# Patient Record
Sex: FEMALE | Race: WHITE | NOT HISPANIC OR LATINO
[De-identification: names, ages, dates, MRNs, and addresses within clinical notes are randomized per-mention and may not be internally consistent; named-entity substitution may affect disease eponyms.]

---

## 2021-09-29 ENCOUNTER — NON-APPOINTMENT (OUTPATIENT)
Age: 56
End: 2021-09-29

## 2021-09-29 ENCOUNTER — APPOINTMENT (OUTPATIENT)
Dept: INTERNAL MEDICINE | Facility: CLINIC | Age: 56
End: 2021-09-29
Payer: COMMERCIAL

## 2021-09-29 VITALS
SYSTOLIC BLOOD PRESSURE: 122 MMHG | HEIGHT: 66 IN | OXYGEN SATURATION: 98 % | RESPIRATION RATE: 14 BRPM | HEART RATE: 85 BPM | TEMPERATURE: 97.2 F | WEIGHT: 190 LBS | DIASTOLIC BLOOD PRESSURE: 76 MMHG | BODY MASS INDEX: 30.53 KG/M2

## 2021-09-29 DIAGNOSIS — Z83.79 FAMILY HISTORY OF OTHER DISEASES OF THE DIGESTIVE SYSTEM: ICD-10-CM

## 2021-09-29 DIAGNOSIS — Z82.49 FAMILY HISTORY OF ISCHEMIC HEART DISEASE AND OTHER DISEASES OF THE CIRCULATORY SYSTEM: ICD-10-CM

## 2021-09-29 DIAGNOSIS — Z63.5 DISRUPTION OF FAMILY BY SEPARATION AND DIVORCE: ICD-10-CM

## 2021-09-29 DIAGNOSIS — Z23 ENCOUNTER FOR IMMUNIZATION: ICD-10-CM

## 2021-09-29 DIAGNOSIS — J30.2 OTHER SEASONAL ALLERGIC RHINITIS: ICD-10-CM

## 2021-09-29 DIAGNOSIS — Z83.3 FAMILY HISTORY OF DIABETES MELLITUS: ICD-10-CM

## 2021-09-29 DIAGNOSIS — Z80.6 FAMILY HISTORY OF LEUKEMIA: ICD-10-CM

## 2021-09-29 DIAGNOSIS — Z78.9 OTHER SPECIFIED HEALTH STATUS: ICD-10-CM

## 2021-09-29 PROCEDURE — 99386 PREV VISIT NEW AGE 40-64: CPT | Mod: 25

## 2021-09-29 PROCEDURE — 93000 ELECTROCARDIOGRAM COMPLETE: CPT

## 2021-09-29 RX ORDER — MULTIVIT-MIN/FA/LYCOPEN/LUTEIN .4-300-25
TABLET ORAL
Refills: 0 | Status: ACTIVE | COMMUNITY
Start: 2021-09-29

## 2021-09-29 SDOH — SOCIAL STABILITY - SOCIAL INSECURITY: DISRUPTION OF FAMILY BY SEPARATION AND DIVORCE: Z63.5

## 2021-09-29 NOTE — ASSESSMENT
[FreeTextEntry1] : -PMH: HTN, Anxiety, Depression\par -SH: . 2 Children. Non-smoker. Occasional EtOH use. m\par \par DEONTE is a 56 year F whom is here today for an annual well check and to establish care w/ a new PMD\par \par Specialists Involved:\par -Prior PMD: Dr. Kaylyn Cramer ([phone])\par -OBGYN: Dr. Aubrie Espitia ([phone])\par -GI: Dr. Foley\par \par EKG obtained in office today demonstrates NSR. normal axis/Intervals. Good-R-wave progression. Normal EKG. \par \par -F/u labs drawn in office today\par -Further recs pending lab results\par -F/u Colonoscopy report from GI\par -F/u Mammogram & Pap from Gyn\par -F/u Immunization records from prior PMD\par -RTO 6mo for routine f/u & labs or sooner if needed

## 2021-09-29 NOTE — HISTORY OF PRESENT ILLNESS
[FreeTextEntry1] : establish care\par Annual well visit [de-identified] : -PMH: HTN, Anxiety, Depression\par -SH: . 2 Children. Lives w/her mother. Non-smoker. Occasional EtOH use. m\par \par DEONTE is a 56 year F whom is here today for an annual well check and to establish care w/ a new PMD\par Today, pt reports feeling well and is w/o complaints. \par She denies any changes since our last f/u visit\par \par Specialists Involved:\par -Prior PMD: Dr. Kaylyn Cramer ([phone])\par -OBGYN: Dr. Aubrie Espitia ([phone])\par -GI: Dr. Foley\par \par -Vaccines: Needs Flu, Shingles (Declines due to concern for allergies)\par -Mammogram: 12/2020\par -Pap Smear: 12/2020\par -Colonoscopy: 6/2017\par -FH of Colon, breast or ovarian CA: None known\par \par -HTN: Remains on Losartan 25mg QD. No reported changes\par -Anxiety, Depression: Relates it to the loss of her father in 2017. Reports issues with anxiety & depression in the past. Had been on Paxil in the past. Follows w/ a therapist Qweekly.

## 2021-09-29 NOTE — HEALTH RISK ASSESSMENT
[Very Good] : ~his/her~  mood as very good [3] : 2) Feeling down, depressed, or hopeless for nearly every day (3) [PHQ-2 Positive] : PHQ-2 Positive [Patient declined mammogram] : Patient declined mammogram [Patient declined PAP Smear] : Patient declined PAP Smear [HIV test declined] : HIV test declined [Hepatitis C test declined] : Hepatitis C test declined [Alone] : lives alone [None] : None [Employed] : employed [] :  [# Of Children ___] : has [unfilled] children [Reviewed no changes] : Reviewed, no changes [] : No [No] : No [Moderately Severe] : severity of depression is moderately severe [Audit-CScore] : 0 [WQB8Qnlku] : 6 [AZA5EzicjNobvg] : 19 [Change in mental status noted] : No change in mental status noted [Reports changes in hearing] : Reports no changes in hearing [Reports changes in vision] : Reports no changes in vision [MammogramDate] : 12/20 [MammogramComments] : records requested [PapSmearDate] : 12/20 [PapSmearComments] : records requested [ColonoscopyComments] : records requested [AdvancecareDate] : 09/21

## 2021-09-30 ENCOUNTER — NON-APPOINTMENT (OUTPATIENT)
Age: 56
End: 2021-09-30

## 2021-09-30 LAB
ALBUMIN SERPL ELPH-MCNC: 4.6 G/DL
ALP BLD-CCNC: 102 U/L
ALT SERPL-CCNC: 31 U/L
ANION GAP SERPL CALC-SCNC: 13 MMOL/L
AST SERPL-CCNC: 22 U/L
BASOPHILS # BLD AUTO: 0.08 K/UL
BASOPHILS NFR BLD AUTO: 0.9 %
BILIRUB SERPL-MCNC: 0.2 MG/DL
BUN SERPL-MCNC: 20 MG/DL
CALCIUM SERPL-MCNC: 9.6 MG/DL
CHLORIDE SERPL-SCNC: 105 MMOL/L
CHOLEST SERPL-MCNC: 242 MG/DL
CO2 SERPL-SCNC: 23 MMOL/L
CREAT SERPL-MCNC: 0.91 MG/DL
CREAT SPEC-SCNC: 135 MG/DL
EOSINOPHIL # BLD AUTO: 0.51 K/UL
EOSINOPHIL NFR BLD AUTO: 5.8 %
ESTIMATED AVERAGE GLUCOSE: 128 MG/DL
GLUCOSE SERPL-MCNC: 115 MG/DL
HBA1C MFR BLD HPLC: 6.1 %
HCT VFR BLD CALC: 42.3 %
HDLC SERPL-MCNC: 37 MG/DL
HGB BLD-MCNC: 13.5 G/DL
IMM GRANULOCYTES NFR BLD AUTO: 0.7 %
LDLC SERPL CALC-MCNC: 142 MG/DL
LYMPHOCYTES # BLD AUTO: 1.68 K/UL
LYMPHOCYTES NFR BLD AUTO: 19.1 %
MAN DIFF?: NORMAL
MCHC RBC-ENTMCNC: 28.3 PG
MCHC RBC-ENTMCNC: 31.9 GM/DL
MCV RBC AUTO: 88.7 FL
MICROALBUMIN 24H UR DL<=1MG/L-MCNC: 1.6 MG/DL
MICROALBUMIN/CREAT 24H UR-RTO: 12 MG/G
MONOCYTES # BLD AUTO: 0.62 K/UL
MONOCYTES NFR BLD AUTO: 7.1 %
NEUTROPHILS # BLD AUTO: 5.83 K/UL
NEUTROPHILS NFR BLD AUTO: 66.4 %
NONHDLC SERPL-MCNC: 205 MG/DL
PLATELET # BLD AUTO: 341 K/UL
POTASSIUM SERPL-SCNC: 4.6 MMOL/L
PROT SERPL-MCNC: 7.1 G/DL
RBC # BLD: 4.77 M/UL
RBC # FLD: 14.4 %
SODIUM SERPL-SCNC: 141 MMOL/L
TRIGL SERPL-MCNC: 315 MG/DL
WBC # FLD AUTO: 8.78 K/UL

## 2021-10-10 ENCOUNTER — EMERGENCY (EMERGENCY)
Facility: HOSPITAL | Age: 56
LOS: 1 days | Discharge: HOME | End: 2021-10-10
Attending: EMERGENCY MEDICINE
Payer: COMMERCIAL

## 2021-10-10 VITALS
WEIGHT: 184.97 LBS | RESPIRATION RATE: 18 BRPM | HEART RATE: 91 BPM | TEMPERATURE: 99 F | SYSTOLIC BLOOD PRESSURE: 173 MMHG | OXYGEN SATURATION: 98 % | HEIGHT: 65 IN | DIASTOLIC BLOOD PRESSURE: 78 MMHG

## 2021-10-10 PROCEDURE — 99284 EMERGENCY DEPT VISIT MOD MDM: CPT

## 2021-10-10 PROCEDURE — 99282 EMERGENCY DEPT VISIT SF MDM: CPT

## 2021-10-10 NOTE — ED PROVIDER NOTE - CARE PROVIDER_API CALL
Vaughn Cheung)  Ophthalmology  [address], [address]  [place] [address]  Phone: [phone]  Fax: [phone]  Follow Up Time: Urgent

## 2021-10-10 NOTE — ED PROVIDER NOTE - NSFOLLOWUPINSTRUCTIONS_ED_ALL_ED_FT
1. Follow up with ophthalmology tomorrow for reevaluation.  2.  Return to the Emergency Department for worsening, progressive or any other concerning symptoms.         Vitreous Detachment       Vitreous detachment is part of the normal aging process in the eyes. Vitreous is the jelly-like substance that makes up most of the inside of the eyeballs. It helps the eyeballs keep a round shape. The vitreous is attached to the retina of the eye with a series of fibers.    As you age, the vitreous gradually shrinks. Tension increases between the fibers and the retina. Eventually, the fibers can break free from the retina, causing vitreous detachment. In most cases, this does not cause problems and does not require treatment. However, it can sometimes cause the retina to separate from the eyeball (retinal detachment), which requires treatment to prevent vision loss.      What are the causes?    Aging is the main cause of vitreous detachment. Everyone's vitreous naturally shrinks with age.      What increases the risk?  You are more likely to have vitreous detachment if you:  •Are at least 50 years old.      •Have inflammation of the eye.      •Have an eye injury.      •Have had eye surgery.      •Have a hemorrhage in your eye.      •Are very nearsighted (myopia).      •Have diabetes.        What are the signs or symptoms?  Most people with this condition will not notice any symptoms. If symptoms do occur, the most common are floaters. Floaters occur as the vitreous begins to shrink. They may:  •Appear as tiny dots or webs in your vision.      •Seem to disappear when you look at them directly.      •Appear more often as your condition gets worse.    Other symptoms include:  •Flashes of light (photopsia) in your peripheral vision that may look like lightning streaks.      •Decreased vision or a dark curtain or shadow moving across your field of vision. This is rare.        How is this diagnosed?  This condition may be diagnosed based on:  •Your signs and symptoms.    •An exam by a health care provider who specializes in conditions and diseases of the eye (ophthalmologist). The exam may include:  •Putting eye drops in your eye to make the pupil wider (dilated). The pupil is the opening in the center of the eye.      •Checking the pupils with a magnifying glass. This exam is the best way to determine the type and extent of damage to your eye.          How is this treated?    For most people, a vitreous detachment is harmless, causing no symptoms or vision loss, and does not require treatment. Floaters usually become less noticeable over time.    If the condition causes retinal detachment, you may need eye surgery to reattach your retina (reattachment surgery) in order to prevent vision loss or restore your vision.      Follow these instructions at home:    •Keep all follow-up visits as told by your health care provider. This is important.        Get help right away if:  •You develop signs of retinal detachment. These include:  •A sudden increase in the number of floaters you see.      •An increase in the number of flashes of light you see in your peripheral vision.      •Decreased vision.          Summary    •Vitreous detachment is part of the normal aging process in the eyes.      •Vitreous is the jelly-like substance inside the eyeballs. As you age, the vitreous shrinks, and the fibers that attach the vitreous to the retina can break free, causing vitreous detachment.      •In most cases, vitreous detachment does not cause symptoms and does not require treatment. The most common symptom that can occur is seeing floaters that appear as tiny dots or webs in your vision.      •Vitreous detachment can sometimes cause the retina to separate from the eyeball (retinal detachment). This must be treated to prevent vision loss.      This information is not intended to replace advice given to you by your health care provider. Make sure you discuss any questions you have with your health care provider.

## 2021-10-10 NOTE — ED PROVIDER NOTE - OBJECTIVE STATEMENT
55yo female with PMH HTN, h/o lasik eye surgery, floaters, follows with ophthalmologist every 6 months presenting with vision change. Patient states that yesterday, she saw a flash of light and then had solitary grey spot in eye that partially obscures vision, went to sleep. When she woke up, she still had spot in eye. Denies headache, nausea, vomiting, eye trauma.

## 2021-10-10 NOTE — ED PROVIDER NOTE - PHYSICAL EXAMINATION
Gen: NAD, AOx3  Head: NCAT  HEENT: PERRL, oral mucosa moist, normal conjunctiva, oropharynx clear without exudate or erythema, visual acuity 20/20 left, 20/40 right  Lung: CTAB, no respiratory distress, no wheezing, rales, rhonchi  CV: normal s1/s2, rrr, no murmurs, Normal perfusion  Abd: soft, NTND  MSK: No edema, no visible deformities  Neuro: No focal neurologic deficits  Skin: No rash   Psych: normal affect   POCUS: Retina intact, vitreous hemorrhage visualized

## 2021-10-10 NOTE — ED PROVIDER NOTE - PATIENT PORTAL LINK FT
You can access the FollowMyHealth Patient Portal offered by [facility] by registering at the following website: [email or web address]. By joining [employer]’s FollowMyHealth portal, you will also be able to view your health information using other applications (apps) compatible with our system.

## 2021-10-10 NOTE — ED PROVIDER NOTE - CLINICAL SUMMARY MEDICAL DECISION MAKING FREE TEXT BOX
Patient with vitreous hemorrhage of R eye, neuro intact, no evidence of retinal detachment on POCUS.

## 2021-10-10 NOTE — ED ADULT TRIAGE NOTE - CHIEF COMPLAINT QUOTE
patient states last night stated seeing flash of light and a black shadow in right eye with floaters, was seen by eye doctor was told that she had floaters, still seeing flashes when she was driving here, sees a big black shadow denies pain or off balance or dizziness, feeling anxious

## 2021-11-10 ENCOUNTER — APPOINTMENT (OUTPATIENT)
Dept: INTERNAL MEDICINE | Facility: CLINIC | Age: 56
End: 2021-11-10
Payer: COMMERCIAL

## 2021-11-10 VITALS
RESPIRATION RATE: 14 BRPM | SYSTOLIC BLOOD PRESSURE: 126 MMHG | WEIGHT: 185 LBS | HEIGHT: 66 IN | DIASTOLIC BLOOD PRESSURE: 76 MMHG | TEMPERATURE: 97.3 F | OXYGEN SATURATION: 98 % | BODY MASS INDEX: 29.73 KG/M2 | HEART RATE: 78 BPM

## 2021-11-10 DIAGNOSIS — H33.311 HORSESHOE TEAR OF RETINA W/OUT DETACHMENT, RIGHT EYE: ICD-10-CM

## 2021-11-10 PROCEDURE — 99214 OFFICE O/P EST MOD 30 MIN: CPT

## 2021-11-10 NOTE — HISTORY OF PRESENT ILLNESS
[FreeTextEntry1] : Anxiety & Depression [de-identified] : -PMH: HTN, HLD, Anxiety, Depression, H/o R. Retinal tear\par -SH: . 2 Children. Lives w/her mother. Non-smoker. Occasional EtOH use. m\par \par DEONTE is a 56 year F whom is here today for f/u Anxiety & Depression after initiation of Fluoxetine\par Today, pt reports that she was recently Dx w/ a right retinal tear which is being treated and she noted improvement. \par Today, pt reports improvement in anxiety since starting fluoxetine. She feels less anxious and denies feeling down or depressed\par \par -HTN: Remains on Losartan 25mg QD. No reported changes\par -Anxiety, Depression: Relates it to the loss of her father in 2017. Reports issues with anxiety & depression in the past. Had been on Paxil in the past. Follows w/ a therapist Qweekly. Changes as noted above.

## 2021-11-10 NOTE — ASSESSMENT
[FreeTextEntry1] : -PMH: HTN, Anxiety, Depression\par -SH: . 2 Children. Non-smoker. Occasional EtOH use. m\par \par DEONTE is a 57 year F whom is here today for f/u Anxiety & Depression\par \par Specialists Involved:\par -Prior PMD: Dr. Kaylyn Cramer ([phone])\par -OBGYN: Dr. Aubrie Espitia ([phone])\par -GI: Dr. Foley\par \par -RTO 6mo for routine f/u & labs or sooner if needed

## 2022-06-01 ENCOUNTER — APPOINTMENT (OUTPATIENT)
Dept: INTERNAL MEDICINE | Facility: CLINIC | Age: 57
End: 2022-06-01
Payer: COMMERCIAL

## 2022-06-01 VITALS
TEMPERATURE: 97.7 F | SYSTOLIC BLOOD PRESSURE: 130 MMHG | HEIGHT: 66 IN | HEART RATE: 101 BPM | DIASTOLIC BLOOD PRESSURE: 70 MMHG | BODY MASS INDEX: 31.98 KG/M2 | WEIGHT: 199 LBS | OXYGEN SATURATION: 98 %

## 2022-06-01 PROCEDURE — 99214 OFFICE O/P EST MOD 30 MIN: CPT | Mod: 25

## 2022-06-01 RX ORDER — FLUOCINOLONE ACETONIDE 0.1 MG/ML
0.01 SOLUTION TOPICAL
Qty: 60 | Refills: 0 | Status: DISCONTINUED | COMMUNITY
Start: 2021-12-02

## 2022-06-01 RX ORDER — MECLIZINE HYDROCHLORIDE 25 MG/1
25 TABLET ORAL
Qty: 60 | Refills: 0 | Status: DISCONTINUED | COMMUNITY
Start: 2022-02-09

## 2022-06-01 RX ORDER — AMOXICILLIN AND CLAVULANATE POTASSIUM 875; 125 MG/1; MG/1
875-125 TABLET, COATED ORAL
Qty: 20 | Refills: 0 | Status: DISCONTINUED | COMMUNITY
Start: 2022-02-01

## 2022-06-01 RX ORDER — FLUTICASONE PROPIONATE 50 UG/1
50 SPRAY, METERED NASAL
Qty: 16 | Refills: 0 | Status: DISCONTINUED | COMMUNITY
Start: 2022-01-16

## 2022-06-01 RX ORDER — HYDROCORTISONE AND ACETIC ACID OTIC 20.75; 10.375 MG/ML; MG/ML
1-2 SOLUTION AURICULAR (OTIC)
Qty: 10 | Refills: 0 | Status: DISCONTINUED | COMMUNITY
Start: 2022-02-09

## 2022-06-01 RX ORDER — MOMETASONE FUROATE 1 MG/G
0.1 OINTMENT TOPICAL
Qty: 15 | Refills: 0 | Status: DISCONTINUED | COMMUNITY
Start: 2022-02-09

## 2022-06-01 NOTE — ASSESSMENT
[FreeTextEntry1] : -PMH: HTN, HLD, Anxiety, Depression, H/o R. Retinal tear\par -SH: . 2 Children. Lives w/her mother. Non-smoker. Occasional EtOH use. \par \par DEONTE is a 56 year F whom is here today for f/u HTN, Anxiety & Depression\par \par Specialists Involved:\par -OBGYN: Dr. Aubrie Espitia ([phone])\par -GI: Dr. Foley ([phone])\par \par -F/u labs drawn in office today\par -Further recs pending lab results\par -RTO January 2023 for CPE or sooner if needed or prefered

## 2022-06-01 NOTE — HISTORY OF PRESENT ILLNESS
[FreeTextEntry1] : Anxiety & Depression [de-identified] : -PMH: HTN, HLD, Anxiety, Depression, H/o R. Retinal tear\par -SH: . 2 Children. Lives w/her mother. Non-smoker. Occasional EtOH use. \par \par DEONTE is a 56 year F whom is here today for f/u HTN, Anxiety & Depression\par Today, pt reports that she is feeling well  \par She also reports continued improvement in anxiety since being placed on fluoxetine. \par \par -HTN: Remains on Losartan 25mg QD. No reported changes\par -Anxiety, Depression: Relates it to the loss of her father in 2017. Reports issues with anxiety & depression in the past. Had been on Paxil in the past. Follows w/ a therapist Qweekly. Changes as noted above.

## 2022-06-02 ENCOUNTER — NON-APPOINTMENT (OUTPATIENT)
Age: 57
End: 2022-06-02

## 2022-06-02 LAB
ALBUMIN SERPL ELPH-MCNC: 4.6 G/DL
ALP BLD-CCNC: 112 U/L
ALT SERPL-CCNC: 39 U/L
ANION GAP SERPL CALC-SCNC: 13 MMOL/L
AST SERPL-CCNC: 27 U/L
BASOPHILS # BLD AUTO: 0.09 K/UL
BASOPHILS NFR BLD AUTO: 1.1 %
BILIRUB SERPL-MCNC: 0.2 MG/DL
BUN SERPL-MCNC: 19 MG/DL
CALCIUM SERPL-MCNC: 10.1 MG/DL
CHLORIDE SERPL-SCNC: 104 MMOL/L
CHOLEST SERPL-MCNC: 234 MG/DL
CO2 SERPL-SCNC: 25 MMOL/L
CREAT SERPL-MCNC: 0.87 MG/DL
CREAT SPEC-SCNC: 95 MG/DL
EGFR: 77 ML/MIN/1.73M2
EOSINOPHIL # BLD AUTO: 0.63 K/UL
EOSINOPHIL NFR BLD AUTO: 7.5 %
ESTIMATED AVERAGE GLUCOSE: 140 MG/DL
GLUCOSE SERPL-MCNC: 85 MG/DL
HBA1C MFR BLD HPLC: 6.5 %
HCT VFR BLD CALC: 43.6 %
HDLC SERPL-MCNC: 33 MG/DL
HGB BLD-MCNC: 13.7 G/DL
IMM GRANULOCYTES NFR BLD AUTO: 0.8 %
LDLC SERPL CALC-MCNC: NORMAL MG/DL
LYMPHOCYTES # BLD AUTO: 0.79 K/UL
LYMPHOCYTES NFR BLD AUTO: 9.4 %
MAN DIFF?: NORMAL
MCHC RBC-ENTMCNC: 28.5 PG
MCHC RBC-ENTMCNC: 31.4 GM/DL
MCV RBC AUTO: 90.8 FL
MICROALBUMIN 24H UR DL<=1MG/L-MCNC: 1.3 MG/DL
MICROALBUMIN/CREAT 24H UR-RTO: 13 MG/G
MONOCYTES # BLD AUTO: 0.66 K/UL
MONOCYTES NFR BLD AUTO: 7.9 %
NEUTROPHILS # BLD AUTO: 6.14 K/UL
NEUTROPHILS NFR BLD AUTO: 73.3 %
NONHDLC SERPL-MCNC: 201 MG/DL
PLATELET # BLD AUTO: 317 K/UL
POTASSIUM SERPL-SCNC: 5 MMOL/L
PROT SERPL-MCNC: 7.3 G/DL
RBC # BLD: 4.8 M/UL
RBC # FLD: 13.9 %
SODIUM SERPL-SCNC: 141 MMOL/L
TRIGL SERPL-MCNC: 410 MG/DL
WBC # FLD AUTO: 8.38 K/UL

## 2022-06-08 ENCOUNTER — APPOINTMENT (OUTPATIENT)
Dept: INTERNAL MEDICINE | Facility: CLINIC | Age: 57
End: 2022-06-08

## 2023-01-18 ENCOUNTER — APPOINTMENT (OUTPATIENT)
Dept: INTERNAL MEDICINE | Facility: CLINIC | Age: 58
End: 2023-01-18
Payer: COMMERCIAL

## 2023-01-18 ENCOUNTER — NON-APPOINTMENT (OUTPATIENT)
Age: 58
End: 2023-01-18

## 2023-01-18 VITALS
OXYGEN SATURATION: 98 % | HEART RATE: 80 BPM | TEMPERATURE: 96.7 F | SYSTOLIC BLOOD PRESSURE: 124 MMHG | WEIGHT: 193 LBS | BODY MASS INDEX: 31.02 KG/M2 | DIASTOLIC BLOOD PRESSURE: 80 MMHG | HEIGHT: 66 IN

## 2023-01-18 DIAGNOSIS — R73.03 PREDIABETES.: ICD-10-CM

## 2023-01-18 PROCEDURE — 99214 OFFICE O/P EST MOD 30 MIN: CPT | Mod: 25

## 2023-01-19 LAB
ALBUMIN SERPL ELPH-MCNC: 4.5 G/DL
ALP BLD-CCNC: 101 U/L
ALT SERPL-CCNC: 34 U/L
ANION GAP SERPL CALC-SCNC: 13 MMOL/L
AST SERPL-CCNC: 22 U/L
BILIRUB SERPL-MCNC: 0.3 MG/DL
BUN SERPL-MCNC: 19 MG/DL
CALCIUM SERPL-MCNC: 9.5 MG/DL
CHLORIDE SERPL-SCNC: 105 MMOL/L
CHOLEST SERPL-MCNC: 250 MG/DL
CO2 SERPL-SCNC: 24 MMOL/L
CREAT SERPL-MCNC: 0.96 MG/DL
EGFR: 68 ML/MIN/1.73M2
ESTIMATED AVERAGE GLUCOSE: 148 MG/DL
GLUCOSE SERPL-MCNC: 105 MG/DL
HBA1C MFR BLD HPLC: 6.8 %
HDLC SERPL-MCNC: 33 MG/DL
LDLC SERPL CALC-MCNC: 166 MG/DL
NONHDLC SERPL-MCNC: 217 MG/DL
POTASSIUM SERPL-SCNC: 5 MMOL/L
PROT SERPL-MCNC: 7.1 G/DL
SODIUM SERPL-SCNC: 141 MMOL/L
TRIGL SERPL-MCNC: 253 MG/DL

## 2023-01-19 NOTE — ASSESSMENT
[FreeTextEntry1] : -PMH: HTN, HLD, Anxiety, Depression, H/o R. Retinal tear\par -SH: . 2 Children. Lives w/her mother. Non-smoker. Occasional EtOH use. \par \par DEONTE is a 57 year F whom is here today for f/u HTN, HLD, Anxiety & Depression\par \par Specialists Involved:\par -OBGYN: Dr. Aubrie Espitia ([phone])\par -GI: Dr. Foley ([phone])\par \par -F/u labs drawn in office today\par -Further recs pending lab results\par -Continue routine f/u w/ Gyn (Cervical CA Screening)\par -RTO 6mo for CPE or sooner if needed

## 2023-01-19 NOTE — ADDENDUM
[FreeTextEntry1] : Uncontrolled cholesterol in setting of persistent DM\par Recommend starting Lipitor 10mg HS\par RTO 3mo \par \par A1c further increased from 6.5 to 6.8\par Diert, exercise & weight strongly advised\par Hold off on starting any medication.\par She will see me back in office in 3mo\par If A1c still elevated at that time we will discuss starting medication for her diabertes

## 2023-01-19 NOTE — HISTORY OF PRESENT ILLNESS
[FreeTextEntry1] : HTN, HLD, Anxiety & Depression [de-identified] : -PMH: HTN, HLD, Anxiety, Depression, H/o R. Retinal tear\par -SH: . 2 Children. Lives w/her mother. Non-smoker. Occasional EtOH use. \par \par DEONTE is a 57 year F whom is here today for f/u HTN, HLD, Anxiety & Depression\par Today, pt reports that she is feeling well \par \par -HTN: Remains on Losartan 25mg QD. No reported changes\par -HLD: Diet controlled\par -T2DM: (6/2022) A1c 6.5\par -Anxiety, Depression: Relates it to the loss of her father in 2017. Reports issues with anxiety & depression in the past. Remains on Fluoxetine 10mg QD. Follows w/ a therapist Qweekly. Changes as noted above.

## 2023-02-07 ENCOUNTER — LABORATORY RESULT (OUTPATIENT)
Age: 58
End: 2023-02-07

## 2023-02-07 ENCOUNTER — NON-APPOINTMENT (OUTPATIENT)
Age: 58
End: 2023-02-07

## 2023-02-07 ENCOUNTER — APPOINTMENT (OUTPATIENT)
Dept: INTERNAL MEDICINE | Facility: CLINIC | Age: 58
End: 2023-02-07
Payer: COMMERCIAL

## 2023-02-07 VITALS
HEART RATE: 77 BPM | BODY MASS INDEX: 31.02 KG/M2 | OXYGEN SATURATION: 98 % | DIASTOLIC BLOOD PRESSURE: 70 MMHG | TEMPERATURE: 97.3 F | SYSTOLIC BLOOD PRESSURE: 122 MMHG | HEIGHT: 66 IN | WEIGHT: 193 LBS

## 2023-02-07 PROCEDURE — 99396 PREV VISIT EST AGE 40-64: CPT | Mod: 25

## 2023-02-07 PROCEDURE — 93000 ELECTROCARDIOGRAM COMPLETE: CPT

## 2023-02-07 RX ORDER — ATORVASTATIN CALCIUM 10 MG/1
10 TABLET, FILM COATED ORAL
Qty: 90 | Refills: 1 | Status: DISCONTINUED | COMMUNITY
Start: 2023-01-24 | End: 2023-02-07

## 2023-02-07 NOTE — PHYSICAL EXAM
[No Acute Distress] : no acute distress [Well Nourished] : well nourished [Well Developed] : well developed [Well-Appearing] : well-appearing [Normal Sclera/Conjunctiva] : normal sclera/conjunctiva [PERRL] : pupils equal round and reactive to light [EOMI] : extraocular movements intact [Normal Outer Ear/Nose] : the outer ears and nose were normal in appearance [Normal Oropharynx] : the oropharynx was normal [No JVD] : no jugular venous distention [No Lymphadenopathy] : no lymphadenopathy [Supple] : supple [Thyroid Normal, No Nodules] : the thyroid was normal and there were no nodules present [No Respiratory Distress] : no respiratory distress  [No Accessory Muscle Use] : no accessory muscle use [Clear to Auscultation] : lungs were clear to auscultation bilaterally [Normal Rate] : normal rate  [Regular Rhythm] : with a regular rhythm [Normal S1, S2] : normal S1 and S2 [No Murmur] : no murmur heard [No Carotid Bruits] : no carotid bruits [No Abdominal Bruit] : a ~M bruit was not heard ~T in the abdomen [No Varicosities] : no varicosities [Pedal Pulses Present] : the pedal pulses are present [No Edema] : there was no peripheral edema [No Palpable Aorta] : no palpable aorta [No Extremity Clubbing/Cyanosis] : no extremity clubbing/cyanosis [Soft] : abdomen soft [Non Tender] : non-tender [Non-distended] : non-distended [No Masses] : no abdominal mass palpated [No HSM] : no HSM [Normal Bowel Sounds] : normal bowel sounds [No CVA Tenderness] : no CVA  tenderness [No Spinal Tenderness] : no spinal tenderness [No Joint Swelling] : no joint swelling [Grossly Normal Strength/Tone] : grossly normal strength/tone [No Rash] : no rash [Coordination Grossly Intact] : coordination grossly intact [No Focal Deficits] : no focal deficits [Normal Gait] : normal gait [Deep Tendon Reflexes (DTR)] : deep tendon reflexes were 2+ and symmetric [Normal Affect] : the affect was normal [Normal Insight/Judgement] : insight and judgment were intact [Comprehensive Foot Exam Normal] : Right and left foot were examined and both feet are normal. No ulcers in either foot. Toes are normal and with full ROM.  Normal tactile sensation with monofilament testing throughout both feet

## 2023-02-08 LAB
ALBUMIN SERPL ELPH-MCNC: 4.5 G/DL
ALP BLD-CCNC: 96 U/L
ALT SERPL-CCNC: 34 U/L
ANION GAP SERPL CALC-SCNC: 12 MMOL/L
AST SERPL-CCNC: 22 U/L
BASOPHILS # BLD AUTO: 0.09 K/UL
BASOPHILS NFR BLD AUTO: 1.1 %
BILIRUB SERPL-MCNC: 0.2 MG/DL
BUN SERPL-MCNC: 17 MG/DL
CALCIUM SERPL-MCNC: 9.5 MG/DL
CHLORIDE SERPL-SCNC: 106 MMOL/L
CHOLEST SERPL-MCNC: 175 MG/DL
CO2 SERPL-SCNC: 25 MMOL/L
CREAT SERPL-MCNC: 0.94 MG/DL
EGFR: 70 ML/MIN/1.73M2
EOSINOPHIL # BLD AUTO: 0.49 K/UL
EOSINOPHIL NFR BLD AUTO: 5.8 %
ESTIMATED AVERAGE GLUCOSE: 143 MG/DL
GLUCOSE SERPL-MCNC: 119 MG/DL
HBA1C MFR BLD HPLC: 6.6 %
HCT VFR BLD CALC: 42 %
HDLC SERPL-MCNC: 34 MG/DL
HGB BLD-MCNC: 13.1 G/DL
IMM GRANULOCYTES NFR BLD AUTO: 0.8 %
LDLC SERPL CALC-MCNC: 107 MG/DL
LYMPHOCYTES # BLD AUTO: 2.05 K/UL
LYMPHOCYTES NFR BLD AUTO: 24.1 %
MAN DIFF?: NORMAL
MCHC RBC-ENTMCNC: 28.1 PG
MCHC RBC-ENTMCNC: 31.2 GM/DL
MCV RBC AUTO: 90.1 FL
MONOCYTES # BLD AUTO: 0.61 K/UL
MONOCYTES NFR BLD AUTO: 7.2 %
NEUTROPHILS # BLD AUTO: 5.2 K/UL
NEUTROPHILS NFR BLD AUTO: 61 %
NONHDLC SERPL-MCNC: 141 MG/DL
PLATELET # BLD AUTO: 351 K/UL
POTASSIUM SERPL-SCNC: 4.7 MMOL/L
PROT SERPL-MCNC: 6.7 G/DL
RBC # BLD: 4.66 M/UL
RBC # FLD: 14.1 %
SODIUM SERPL-SCNC: 143 MMOL/L
TRIGL SERPL-MCNC: 173 MG/DL
TSH SERPL-ACNC: 1.47 UIU/ML
WBC # FLD AUTO: 8.51 K/UL

## 2023-02-08 NOTE — ASSESSMENT
[FreeTextEntry1] : -PMH: HTN, HLD, Anxiety, Depression, H/o R. Retinal tear\par -SH: . 2 Children. Lives w/her mother. Non-smoker. Occasional EtOH use.\par \par DEONTE is a 57 year F whom is here today for an annual well check\par \par Specialists Involved:\par -OBGYN: Dr. Aubrie Espitia ([phone])\par -GI: Dr. Foley ([phone])\par \par EKG obtained in office today demonstrates NSR. normal axis/Intervals. Good-R-wave progression. Normal EKG. \par \par -F/u labs drawn in office today\par -Further recs pending lab results\par -Continue routine screening w/ Gyn (Cervical CA Screening)\par -RTO 6mo for routine f/u & labs or sooner if needed

## 2023-02-08 NOTE — HISTORY OF PRESENT ILLNESS
[FreeTextEntry1] : establish care\par Annual well visit [de-identified] : -PMH: HTN, HLD, Anxiety, Depression, H/o R. Retinal tear\par -SH: . 2 Children. Lives w/her mother. Non-smoker. Occasional EtOH use.\par \par DEONTE is a 57 year F whom is here today for an annual well check\par \par Specialists Involved:\par -OBGYN: Dr. Aubrie Espitia ([phone])\par -GI: Dr. Foley ([phone])\par -Optho: Dr. Chalo Lizarraga\par \par -Vaccines: Needs COVID, Flu, Shingles, Prevnar 20 (Declines due to concern for allergies)\par -Mammogram: 12/2022\par -Pap Smear: 1/2023\par -Colonoscopy: 6/2017. Repeat 10yrs\par -FH of Colon, breast or ovarian CA: None known\par \par -HTN: Remains on Losartan 25mg QD. No reported changes\par -HLD: Myalgias developed since starting Lipitor\par -T2DM: Diet controlled. (6/2022) A1c 6.5\par -Anxiety, Depression: Relates it to the loss of her father in 2017. Reports issues with anxiety & depression in the past. Remains on Fluoxetine 10mg QD. Follows w/ a therapist Qweekly. Changes as noted above.

## 2023-02-08 NOTE — ADDENDUM
[FreeTextEntry1] : Persistent DM with an A1c of 6.6\par Diet, exercise & weight loss advised\par RTO 3mo for repeat labs\par If still elevated at that time i would recommend starting Metformin\par \par Cholesterol notably improved on statin\par Recommendations regarding this as discussed in office yesterday\par

## 2023-02-08 NOTE — HEALTH RISK ASSESSMENT
[Very Good] : ~his/her~  mood as very good [No] : In the past 12 months have you used drugs other than those required for medical reasons? No [HIV test declined] : HIV test declined [Hepatitis C test declined] : Hepatitis C test declined [None] : None [Alone] : lives alone [Employed] : employed [] :  [# Of Children ___] : has [unfilled] children [Reviewed no changes] : Reviewed, no changes [No falls in past year] : Patient reported no falls in the past year [0] : 2) Feeling down, depressed, or hopeless: Not at all (0) [PHQ-2 Negative - No further assessment needed] : PHQ-2 Negative - No further assessment needed [No Retinopathy] : No retinopathy [Patient reported mammogram was normal] : Patient reported mammogram was normal [Patient reported PAP Smear was abnormal] : Patient reported PAP Smear was abnormal [Patient reported colonoscopy was normal] : Patient reported colonoscopy was normal [Fully functional (bathing, dressing, toileting, transferring, walking, feeding)] : Fully functional (bathing, dressing, toileting, transferring, walking, feeding) [Fully functional (using the telephone, shopping, preparing meals, housekeeping, doing laundry, using] : Fully functional and needs no help or supervision to perform IADLs (using the telephone, shopping, preparing meals, housekeeping, doing laundry, using transportation, managing medications and managing finances) [Never] : Never [Audit-CScore] : 0 [[id number]] : 0 [EyeExamDate] : 12/22 [Change in mental status noted] : No change in mental status noted [Reports changes in hearing] : Reports no changes in hearing [Reports changes in vision] : Reports no changes in vision [MammogramDate] : 12/22 [PapSmearDate] : 01/23 [ColonoscopyDate] : 06/17 [AdvancecareDate] : 02/23

## 2023-02-09 LAB
CREAT SPEC-SCNC: 237 MG/DL
MICROALBUMIN 24H UR DL<=1MG/L-MCNC: 2.3 MG/DL
MICROALBUMIN/CREAT 24H UR-RTO: 10 MG/G

## 2023-03-06 ENCOUNTER — RX RENEWAL (OUTPATIENT)
Age: 58
End: 2023-03-06

## 2023-03-23 ENCOUNTER — RX RENEWAL (OUTPATIENT)
Age: 58
End: 2023-03-23

## 2023-07-31 ENCOUNTER — RX RENEWAL (OUTPATIENT)
Age: 58
End: 2023-07-31

## 2023-09-27 ENCOUNTER — RX RENEWAL (OUTPATIENT)
Age: 58
End: 2023-09-27

## 2023-10-03 ENCOUNTER — APPOINTMENT (OUTPATIENT)
Dept: INTERNAL MEDICINE | Facility: CLINIC | Age: 58
End: 2023-10-03
Payer: COMMERCIAL

## 2023-10-03 VITALS
DIASTOLIC BLOOD PRESSURE: 70 MMHG | OXYGEN SATURATION: 98 % | WEIGHT: 196 LBS | HEART RATE: 69 BPM | SYSTOLIC BLOOD PRESSURE: 120 MMHG | RESPIRATION RATE: 14 BRPM | BODY MASS INDEX: 31.5 KG/M2 | HEIGHT: 66 IN | TEMPERATURE: 97.2 F

## 2023-10-03 DIAGNOSIS — R53.83 OTHER FATIGUE: ICD-10-CM

## 2023-10-03 DIAGNOSIS — F41.1 GENERALIZED ANXIETY DISORDER: ICD-10-CM

## 2023-10-03 DIAGNOSIS — E78.5 HYPERLIPIDEMIA, UNSPECIFIED: ICD-10-CM

## 2023-10-03 PROCEDURE — 99214 OFFICE O/P EST MOD 30 MIN: CPT | Mod: 25

## 2023-10-03 RX ORDER — CETIRIZINE HYDROCHLORIDE 10 MG/1
10 CAPSULE, LIQUID FILLED ORAL
Qty: 90 | Refills: 3 | Status: ACTIVE | COMMUNITY
Start: 2021-09-29 | End: 1900-01-01

## 2023-10-04 LAB
ALBUMIN SERPL ELPH-MCNC: 4.5 G/DL
ALP BLD-CCNC: 103 U/L
ALT SERPL-CCNC: 40 U/L
ANION GAP SERPL CALC-SCNC: 13 MMOL/L
AST SERPL-CCNC: 25 U/L
BILIRUB SERPL-MCNC: 0.3 MG/DL
BUN SERPL-MCNC: 18 MG/DL
CALCIUM SERPL-MCNC: 9.4 MG/DL
CHLORIDE SERPL-SCNC: 105 MMOL/L
CO2 SERPL-SCNC: 23 MMOL/L
CREAT SERPL-MCNC: 0.86 MG/DL
CREAT SPEC-SCNC: 106 MG/DL
EGFR: 77 ML/MIN/1.73M2
GLUCOSE SERPL-MCNC: 137 MG/DL
MICROALBUMIN 24H UR DL<=1MG/L-MCNC: <1.2 MG/DL
MICROALBUMIN/CREAT 24H UR-RTO: NORMAL MG/G
POTASSIUM SERPL-SCNC: 4.6 MMOL/L
PROT SERPL-MCNC: 6.9 G/DL
SODIUM SERPL-SCNC: 141 MMOL/L

## 2023-10-09 LAB
BASOPHILS # BLD AUTO: 0.12 K/UL
BASOPHILS NFR BLD AUTO: 1.1 %
CHOLEST SERPL-MCNC: 206 MG/DL
EOSINOPHIL # BLD AUTO: 0.86 K/UL
EOSINOPHIL NFR BLD AUTO: 7.6 %
ESTIMATED AVERAGE GLUCOSE: 154 MG/DL
FERRITIN SERPL-MCNC: 187 NG/ML
HBA1C MFR BLD HPLC: 7 %
HCT VFR BLD CALC: 41.9 %
HDLC SERPL-MCNC: 40 MG/DL
HGB BLD-MCNC: 13.3 G/DL
IMM GRANULOCYTES NFR BLD AUTO: 0.7 %
IRON SATN MFR SERPL: 28 %
IRON SERPL-MCNC: 86 UG/DL
LDLC SERPL CALC-MCNC: 112 MG/DL
LYMPHOCYTES # BLD AUTO: 2.32 K/UL
LYMPHOCYTES NFR BLD AUTO: 20.5 %
MAN DIFF?: NORMAL
MCHC RBC-ENTMCNC: 28.5 PG
MCHC RBC-ENTMCNC: 31.7 GM/DL
MCV RBC AUTO: 89.9 FL
MONOCYTES # BLD AUTO: 0.78 K/UL
MONOCYTES NFR BLD AUTO: 6.9 %
NEUTROPHILS # BLD AUTO: 7.14 K/UL
NEUTROPHILS NFR BLD AUTO: 63.2 %
NONHDLC SERPL-MCNC: 166 MG/DL
PLATELET # BLD AUTO: 314 K/UL
RBC # BLD: 4.66 M/UL
RBC # FLD: 13.8 %
TIBC SERPL-MCNC: 304 UG/DL
TRIGL SERPL-MCNC: 308 MG/DL
UIBC SERPL-MCNC: 218 UG/DL
WBC # FLD AUTO: 11.3 K/UL

## 2023-10-11 ENCOUNTER — NON-APPOINTMENT (OUTPATIENT)
Age: 58
End: 2023-10-11

## 2023-10-23 ENCOUNTER — APPOINTMENT (OUTPATIENT)
Dept: INTERNAL MEDICINE | Facility: CLINIC | Age: 58
End: 2023-10-23
Payer: COMMERCIAL

## 2023-10-23 PROCEDURE — 36415 COLL VENOUS BLD VENIPUNCTURE: CPT

## 2023-10-24 LAB
BASOPHILS # BLD AUTO: 0.11 K/UL
BASOPHILS NFR BLD AUTO: 1.1 %
EOSINOPHIL # BLD AUTO: 0.63 K/UL
EOSINOPHIL NFR BLD AUTO: 6.1 %
HCT VFR BLD CALC: 42.3 %
HGB BLD-MCNC: 13.8 G/DL
IMM GRANULOCYTES NFR BLD AUTO: 0.7 %
LYMPHOCYTES # BLD AUTO: 2.62 K/UL
LYMPHOCYTES NFR BLD AUTO: 25.2 %
MAN DIFF?: NORMAL
MCHC RBC-ENTMCNC: 28.9 PG
MCHC RBC-ENTMCNC: 32.6 GM/DL
MCV RBC AUTO: 88.7 FL
MONOCYTES # BLD AUTO: 0.78 K/UL
MONOCYTES NFR BLD AUTO: 7.5 %
NEUTROPHILS # BLD AUTO: 6.17 K/UL
NEUTROPHILS NFR BLD AUTO: 59.4 %
PLATELET # BLD AUTO: 363 K/UL
RBC # BLD: 4.77 M/UL
RBC # FLD: 13 %
WBC # FLD AUTO: 10.38 K/UL

## 2023-12-18 ENCOUNTER — RX RENEWAL (OUTPATIENT)
Age: 58
End: 2023-12-18

## 2024-01-11 ENCOUNTER — APPOINTMENT (OUTPATIENT)
Dept: INTERNAL MEDICINE | Facility: CLINIC | Age: 59
End: 2024-01-11
Payer: COMMERCIAL

## 2024-01-11 ENCOUNTER — TRANSCRIPTION ENCOUNTER (OUTPATIENT)
Age: 59
End: 2024-01-11

## 2024-01-11 VITALS
TEMPERATURE: 97.2 F | OXYGEN SATURATION: 98 % | WEIGHT: 197 LBS | HEART RATE: 80 BPM | SYSTOLIC BLOOD PRESSURE: 112 MMHG | BODY MASS INDEX: 31.66 KG/M2 | RESPIRATION RATE: 14 BRPM | DIASTOLIC BLOOD PRESSURE: 78 MMHG | HEIGHT: 66 IN

## 2024-01-11 DIAGNOSIS — Z86.2 PERSONAL HISTORY OF DISEASES OF THE BLOOD AND BLOOD-FORMING ORGANS AND CERTAIN DISORDERS INVOLVING THE IMMUNE MECHANISM: ICD-10-CM

## 2024-01-11 PROCEDURE — 99214 OFFICE O/P EST MOD 30 MIN: CPT | Mod: 25

## 2024-01-12 ENCOUNTER — RX RENEWAL (OUTPATIENT)
Age: 59
End: 2024-01-12

## 2024-01-12 LAB
ALBUMIN SERPL ELPH-MCNC: 4.5 G/DL
ALP BLD-CCNC: 106 U/L
ALT SERPL-CCNC: 35 U/L
ANION GAP SERPL CALC-SCNC: 15 MMOL/L
AST SERPL-CCNC: 29 U/L
BILIRUB SERPL-MCNC: 0.2 MG/DL
BUN SERPL-MCNC: 20 MG/DL
CALCIUM SERPL-MCNC: 9.6 MG/DL
CHLORIDE SERPL-SCNC: 103 MMOL/L
CHOLEST SERPL-MCNC: 175 MG/DL
CO2 SERPL-SCNC: 22 MMOL/L
CREAT SERPL-MCNC: 0.87 MG/DL
CREAT SPEC-SCNC: 125 MG/DL
EGFR: 76 ML/MIN/1.73M2
ESTIMATED AVERAGE GLUCOSE: 186 MG/DL
GLUCOSE SERPL-MCNC: 157 MG/DL
HBA1C MFR BLD HPLC: 8.1 %
HDLC SERPL-MCNC: 35 MG/DL
LDLC SERPL CALC-MCNC: 84 MG/DL
MICROALBUMIN 24H UR DL<=1MG/L-MCNC: 1.4 MG/DL
MICROALBUMIN/CREAT 24H UR-RTO: 11 MG/G
NONHDLC SERPL-MCNC: 140 MG/DL
POTASSIUM SERPL-SCNC: 4.8 MMOL/L
PROT SERPL-MCNC: 7.1 G/DL
SODIUM SERPL-SCNC: 140 MMOL/L
TRIGL SERPL-MCNC: 345 MG/DL

## 2024-01-12 RX ORDER — CETIRIZINE HYDROCHLORIDE 10 MG/1
10 TABLET, COATED ORAL
Qty: 90 | Refills: 3 | Status: ACTIVE | COMMUNITY
Start: 2021-11-10 | End: 1900-01-01

## 2024-01-12 NOTE — ASSESSMENT
[FreeTextEntry1] :  -F/u labs drawn in office today -Further recs pending lab results -RTO 3mo or sooner if needed

## 2024-01-12 NOTE — ADDENDUM
[FreeTextEntry1] :  A1c 8.1 from 7.0 At this time I would strongly recommend the initiation of medication to help control her blood glucose levels while she attempts to make dietary/lifestyle modifications with goal of weight loss Recommend metformin 500 mg twice daily She is to return to office in 3 months sooner if needed I did also like to set her up with a blood glucose monitor and supplies If she needs instruction on how to use it she can return to office so we can educate her Her cholesterol is improved but not yet at goal Therefore recommend increase rosuvastatin up to 20 mg RTO 3 months

## 2024-01-12 NOTE — HISTORY OF PRESENT ILLNESS
[FreeTextEntry1] : f/u DM, HTN, JOURDAN, HLD [de-identified] : -PMH: DM, HTN, HLD, Anxiety, Depression, H/o R. Retinal tear -SH: . 2 Children. Lives w/her mother. Non-smoker. Occasional EtOH use.  DEONTE is a 58 year F whom is here today for f/u DM, HTN, JOURDAN, HLD today, pt reports feeling well   -HTN: Remains on Losartan 25mg QD. No reported changes -HLD: Remains on Crestor 10mg -T2DM: Diet controlled. (10/2023) A1c 7.0 -Anxiety, Depression: Relates it to the loss of her father in 2017. Reports issues with anxiety & depression in the past. Remains on Fluoxetine 10mg QD. Follows w/ a therapist Qweekly. Changes as noted above.

## 2024-01-15 RX ORDER — BLOOD-GLUCOSE METER
KIT MISCELLANEOUS
Qty: 1 | Refills: 0 | Status: ACTIVE | COMMUNITY
Start: 2024-01-15 | End: 1900-01-01

## 2024-01-15 RX ORDER — BLOOD-GLUCOSE METER
W/DEVICE KIT MISCELLANEOUS
Qty: 1 | Refills: 0 | Status: ACTIVE | COMMUNITY
Start: 2024-01-15 | End: 1900-01-01

## 2024-01-17 ENCOUNTER — APPOINTMENT (OUTPATIENT)
Dept: INTERNAL MEDICINE | Facility: CLINIC | Age: 59
End: 2024-01-17
Payer: COMMERCIAL

## 2024-01-17 PROCEDURE — 97535 SELF CARE MNGMENT TRAINING: CPT

## 2024-01-22 ENCOUNTER — NON-APPOINTMENT (OUTPATIENT)
Age: 59
End: 2024-01-22

## 2024-02-21 ENCOUNTER — APPOINTMENT (OUTPATIENT)
Dept: INTERNAL MEDICINE | Facility: CLINIC | Age: 59
End: 2024-02-21

## 2024-03-14 ENCOUNTER — APPOINTMENT (OUTPATIENT)
Dept: INTERNAL MEDICINE | Facility: CLINIC | Age: 59
End: 2024-03-14
Payer: COMMERCIAL

## 2024-03-14 VITALS
SYSTOLIC BLOOD PRESSURE: 100 MMHG | HEART RATE: 77 BPM | OXYGEN SATURATION: 98 % | RESPIRATION RATE: 14 BRPM | HEIGHT: 66 IN | BODY MASS INDEX: 30.53 KG/M2 | DIASTOLIC BLOOD PRESSURE: 68 MMHG | WEIGHT: 190 LBS | TEMPERATURE: 97.3 F

## 2024-03-14 VITALS — SYSTOLIC BLOOD PRESSURE: 138 MMHG | DIASTOLIC BLOOD PRESSURE: 80 MMHG

## 2024-03-14 LAB
BASOPHILS # BLD AUTO: 0.13 K/UL
BASOPHILS NFR BLD AUTO: 1.3 %
EOSINOPHIL # BLD AUTO: 0.82 K/UL
EOSINOPHIL NFR BLD AUTO: 8.3 %
HCT VFR BLD CALC: 40.5 %
HGB BLD-MCNC: 13.7 G/DL
IMM GRANULOCYTES NFR BLD AUTO: 0.4 %
LYMPHOCYTES # BLD AUTO: 2.18 K/UL
LYMPHOCYTES NFR BLD AUTO: 22 %
MAN DIFF?: NORMAL
MCHC RBC-ENTMCNC: 28.4 PG
MCHC RBC-ENTMCNC: 33.8 GM/DL
MCV RBC AUTO: 83.9 FL
MONOCYTES # BLD AUTO: 0.67 K/UL
MONOCYTES NFR BLD AUTO: 6.8 %
NEUTROPHILS # BLD AUTO: 6.06 K/UL
NEUTROPHILS NFR BLD AUTO: 61.2 %
PLATELET # BLD AUTO: 367 K/UL
RBC # BLD: 4.83 M/UL
RBC # FLD: 13.3 %
WBC # FLD AUTO: 9.9 K/UL

## 2024-03-14 PROCEDURE — 99214 OFFICE O/P EST MOD 30 MIN: CPT

## 2024-03-14 RX ORDER — METFORMIN HYDROCHLORIDE 500 MG/1
500 TABLET, COATED ORAL
Qty: 90 | Refills: 3 | Status: DISCONTINUED | COMMUNITY
Start: 2024-01-15 | End: 2024-03-14

## 2024-03-14 RX ORDER — METFORMIN HYDROCHLORIDE 500 MG/1
500 TABLET, FILM COATED, EXTENDED RELEASE ORAL
Qty: 90 | Refills: 0 | Status: ACTIVE | COMMUNITY
Start: 2024-03-14 | End: 1900-01-01

## 2024-03-14 NOTE — ASSESSMENT
[FreeTextEntry1] : Normal arterial ulses LE b/l Normal monofilament test Check labs today to r/o causes of neuritis Recommend EMG & Neuro consult for completion of evaluation  Change metformin to ER formulation 500mg QD RTO 1mo for CPE She has loss weight. encouragement provided C/w Bg monitoiring

## 2024-03-14 NOTE — HISTORY OF PRESENT ILLNESS
[FreeTextEntry8] : -PMH: DM, HTN, HLD, Anxiety, Depression, H/o R. Retinal tear -SH: . 2 Children. Lives w/her mother. Non-smoker. Occasional EtOH use.  DEONTE is a 58 year F whom is here today w/ c/o b/l LE Pain & Thingling sensation ongoing for almost 2mo m she dnies any loss of sensation or difficulties walking only new med is metformin Pain not worsened with exertion or improved with rest never had anything like this before   bring BG Log with her today for my review. states she was unable to tolerate metformin twice a day 2/2 GIN Upset tehrefore only taking once daily

## 2024-03-18 LAB
FOLATE SERPL-MCNC: 12.8 NG/ML
TSH SERPL-ACNC: 1.55 UIU/ML
VIT B12 SERPL-MCNC: 479 PG/ML

## 2024-03-21 ENCOUNTER — APPOINTMENT (OUTPATIENT)
Dept: INTERNAL MEDICINE | Facility: CLINIC | Age: 59
End: 2024-03-21

## 2024-03-25 ENCOUNTER — RX RENEWAL (OUTPATIENT)
Age: 59
End: 2024-03-25

## 2024-04-01 ENCOUNTER — APPOINTMENT (OUTPATIENT)
Dept: NEUROLOGY | Facility: CLINIC | Age: 59
End: 2024-04-01
Payer: COMMERCIAL

## 2024-04-01 VITALS
HEIGHT: 66 IN | OXYGEN SATURATION: 100 % | WEIGHT: 190 LBS | HEART RATE: 71 BPM | BODY MASS INDEX: 30.53 KG/M2 | SYSTOLIC BLOOD PRESSURE: 132 MMHG | DIASTOLIC BLOOD PRESSURE: 79 MMHG

## 2024-04-01 DIAGNOSIS — G62.9 POLYNEUROPATHY, UNSPECIFIED: ICD-10-CM

## 2024-04-01 DIAGNOSIS — H33.319 HORSESHOE TEAR OF RETINA W/OUT DETACHMENT, UNSPECIFIED EYE: ICD-10-CM

## 2024-04-01 DIAGNOSIS — M79.89 OTHER SPECIFIED SOFT TISSUE DISORDERS: ICD-10-CM

## 2024-04-01 PROCEDURE — 99204 OFFICE O/P NEW MOD 45 MIN: CPT

## 2024-04-01 NOTE — PHYSICAL EXAM
[Place] : oriented to place [Person] : oriented to person [Time] : oriented to time [Concentration Intact] : normal concentrating ability [Naming Objects] : no difficulty naming common objects [Visual Intact] : visual attention was ~T not ~L decreased [Repeating Phrases] : no difficulty repeating a phrase [Writing A Sentence] : no difficulty writing a sentence [Fluency] : fluency intact [Comprehension] : comprehension intact [Reading] : reading intact [Past History] : adequate knowledge of personal past history [Cranial Nerves Oculomotor (III)] : extraocular motion intact [Cranial Nerves Optic (II)] : visual acuity intact bilaterally,  visual fields full to confrontation, pupils equal round and reactive to light [Cranial Nerves Trigeminal (V)] : facial sensation intact symmetrically [Cranial Nerves Vestibulocochlear (VIII)] : hearing was intact bilaterally [Cranial Nerves Facial (VII)] : face symmetrical [Cranial Nerves Accessory (XI - Cranial And Spinal)] : head turning and shoulder shrug symmetric [Cranial Nerves Glossopharyngeal (IX)] : tongue and palate midline [Motor Strength] : muscle strength was normal in all four extremities [Cranial Nerves Hypoglossal (XII)] : there was no tongue deviation with protrusion [Motor Handedness Right-Handed] : the patient is right hand dominant [No Muscle Atrophy] : normal bulk in all four extremities [Sensation Tactile Decrease] : light touch was intact [Sensation Pain / Temperature Decrease] : pain and temperature was intact [Proprioception] : proprioception was intact [Tactile Decrease Superficial Peroneal Nerve Right Leg] : diminished right dorsum of the foot [Vibration Decrease Leg / Foot Right Toes] : decreased at the toes of the right foot [Balance] : balance was intact [2+] : Patella left 2+ [1+] : Ankle jerk left 1+ [General Appearance - In No Acute Distress] : in no acute distress [General Appearance - Alert] : alert [Oriented To Time, Place, And Person] : oriented to person, place, and time [Affect] : the affect was normal [Impaired Insight] : insight and judgment were intact [Over the Past 2 Weeks, Have You Felt Down, Depressed, or Hopeless?] : 1.) Over the past 2 weeks, have you felt down, depressed, or hopeless? No [Over the Past 2 Weeks, Have You Felt Little Interest or Pleasure Doing Things?] : 2.) Over the past 2 weeks, have you felt little interest or pleasure doing things? No [Past-pointing] : there was no past-pointing [Tremor] : no tremor present [Plantar Reflex Right Only] : normal on the right [Plantar Reflex Left Only] : normal on the left [Sclera] : the sclera and conjunctiva were normal [PERRL With Normal Accommodation] : pupils were equal in size, round, reactive to light, with normal accommodation [Extraocular Movements] : extraocular movements were intact [Full Visual Field] : full visual field [Outer Ear] : the ears and nose were normal in appearance [Hearing Threshold Finger Rub Not [place]] : hearing was normal [Oropharynx] : the oropharynx was normal [Neck Appearance] : the appearance of the neck was normal [Respiration, Rhythm And Depth] : normal respiratory rhythm and effort [Nail Clubbing] : no clubbing  or cyanosis of the fingernails [Abnormal Walk] : normal gait [Motor Tone] : muscle strength and tone were normal [Musculoskeletal - Swelling] : no joint swelling seen [Skin Color & Pigmentation] : normal skin color and pigmentation [] : no rash [Skin Turgor] : normal skin turgor

## 2024-04-01 NOTE — DISCUSSION/SUMMARY
[FreeTextEntry1] : 57 y/o woman hx of HTN, HLD, DM II (recent diagnosis), p/w numbness and tingling, feeling of cold in bilateral feet. Most likely related to diabetes and elevated blood sugar. Will rule out other reversible causes including arteriopathy given cold sensation in the legs.   Workup   Labs:  Peripheral neuropathy workup. -  B1, B6, homocysteine, A1c, SHAGGY, Anti  dsDNA, SPEP,, ceruloplasmin, RF, ESR, CRP,   - US LE arterial bilat  - She has EMG/NCS ordered by PCP     Treatment:  - Start alpha- lipioc acid 1600mg daily  - Wants to hold off other treatment for now.     Discussed common side effects of prescribed medications and potential alternatives.          Counseled patient on the importance of maintaining a healthy lifestyle, including balanced diet, adequate hydration, stress management, proper sleep hygiene, and physical activity.  Answered all questions and concerns to the best of my ability. Advised to call for any new or worsening symptoms.      In order to maintain continuity of care/prescription refills, patients must be seen on a yearly basis.   Total time spent on the day of the visit, including pre-visit and post-visit time was 50 minutes.

## 2024-04-01 NOTE — HISTORY OF PRESENT ILLNESS
[FreeTextEntry1] : CC: pain and tingling in B/L LE  Referred by: PCP Dr. Peralta  HPI: 57 y/o woman hx of DMII, HTN, HLD, depression, anxiety, p/w 2 months of bilateral lower extremity pain and tingling. She was recently diagnosed with diabetes and started on metformin twice daily. She was having stomach issues and quickly also started having LE pain and cold sensation. Her PCP changed the dosage of metformin and did fiber testing and referred her to neurology. Her fathers side of the family all had diabetes. Her last A1c was 8.1. Her numbers were slowly increasing for years. She was on once a day dosing and her glucose numbers were better. Sometimes there is a tingling and burning sensation or sharp shooting pain. Her feet are always cold. In the right foot. She has some joint issues in the toes.  She last saw the podiatrist in years.

## 2024-04-01 NOTE — DATA REVIEWED
[de-identified] : March 14th labwork: CBC- slightly elevated eosinophils, B12 479, Folate 12.8, TSH 1.55, A1c 8.1, LDL 84, triglycerides 345, CMP with elevated glucose,

## 2024-04-17 ENCOUNTER — APPOINTMENT (OUTPATIENT)
Dept: INTERNAL MEDICINE | Facility: CLINIC | Age: 59
End: 2024-04-17
Payer: COMMERCIAL

## 2024-04-17 ENCOUNTER — NON-APPOINTMENT (OUTPATIENT)
Age: 59
End: 2024-04-17

## 2024-04-17 VITALS
WEIGHT: 193 LBS | BODY MASS INDEX: 31.02 KG/M2 | OXYGEN SATURATION: 98 % | TEMPERATURE: 97.3 F | HEIGHT: 66 IN | DIASTOLIC BLOOD PRESSURE: 70 MMHG | SYSTOLIC BLOOD PRESSURE: 118 MMHG | RESPIRATION RATE: 14 BRPM | HEART RATE: 79 BPM

## 2024-04-17 DIAGNOSIS — Z13.29 ENCOUNTER FOR SCREENING FOR OTHER SUSPECTED ENDOCRINE DISORDER: ICD-10-CM

## 2024-04-17 DIAGNOSIS — Z00.00 ENCOUNTER FOR GENERAL ADULT MEDICAL EXAMINATION W/OUT ABNORMAL FINDINGS: ICD-10-CM

## 2024-04-17 DIAGNOSIS — Z13.6 ENCOUNTER FOR SCREENING FOR CARDIOVASCULAR DISORDERS: ICD-10-CM

## 2024-04-17 DIAGNOSIS — Z80.51 FAMILY HISTORY OF MALIGNANT NEOPLASM OF KIDNEY: ICD-10-CM

## 2024-04-17 DIAGNOSIS — F33.1 MAJOR DEPRESSIVE DISORDER, RECURRENT, MODERATE: ICD-10-CM

## 2024-04-17 DIAGNOSIS — E11.9 TYPE 2 DIABETES MELLITUS W/OUT COMPLICATIONS: ICD-10-CM

## 2024-04-17 PROCEDURE — 99396 PREV VISIT EST AGE 40-64: CPT

## 2024-04-17 PROCEDURE — 93000 ELECTROCARDIOGRAM COMPLETE: CPT

## 2024-04-17 RX ORDER — ASPIRIN 81 MG/1
81 TABLET, CHEWABLE ORAL
Refills: 0 | Status: ACTIVE | COMMUNITY
Start: 2024-04-17

## 2024-04-17 NOTE — ASSESSMENT
[FreeTextEntry1] : -PMH: DM, HTN, HLD, Anxiety, Depression, H/o R. Retinal tear -SH: . 2 Children. Lives w/her mother. Non-smoker. Occasional EtOH use.  DEONTE is a 58 year F whom is here today for an annual well check  Specialists Involved: -OBGYN: Dr. Aubrie Espitia ([phone]) -GI: Dr. Foley ([phone]) -Optho: Dr. Chalo Lizarraga  EKG obtained in office today demonstrates NSR. normal axis/Intervals. Good-R-wave progression. Normal EKG.   -F/u labs drawn in office today -Further recs pending lab results -Cardio Consult (h/o Obesity, DM, HLD, HTN) -Continue routine screening w/ Gyn (Cervical CA Screening) -RTO 3mo for routine f/u & labs or sooner if needed

## 2024-04-17 NOTE — HISTORY OF PRESENT ILLNESS
[FreeTextEntry1] : Annual well visit [de-identified] : -PMH: DM, HTN, HLD, Anxiety, Depression, H/o R. Retinal tear -SH: . 2 Children. Lives w/her mother. Non-smoker. Occasional EtOH use.  DEONTE is a 58 year F whom is here today for an annual well check  Specialists Involved: -OBGYN: Dr. Aubrie Espitia ([phone]) -GI: Dr. Foley ([phone]) -Optho: Dr. Chalo Lizarraga  -Vaccines: Needs COVID, Flu, Shingles, Prevnar 20 (Declines due to concern for allergies) -Mammogram: 1/2024 -Pap Smear: 1/2023 -Colonoscopy: 6/2017. Repeat 10yrs -FH of Colon, breast or ovarian CA: None known  -HTN: Remains on Losartan 25mg QD. No reported changes -HLD: Remains on Crestor 10mg -T2DM: Remains on Metformin ER 500mg QD. On ASA & Statin. Compliant w/ BG monitoring. (1/2024) A1c 8.1.  -Anxiety, Depression: Relates it to the loss of her father in 2017. Reports issues with anxiety & depression in the past. Remains on Fluoxetine 10mg QD. Follows w/ a therapist Qweekly. Changes as noted above.

## 2024-04-17 NOTE — HEALTH RISK ASSESSMENT
[No] : In the past 12 months have you used drugs other than those required for medical reasons? No [No falls in past year] : Patient reported no falls in the past year [No Retinopathy] : No retinopathy [Patient reported mammogram was normal] : Patient reported mammogram was normal [Patient reported colonoscopy was normal] : Patient reported colonoscopy was normal [HIV test declined] : HIV test declined [Hepatitis C test declined] : Hepatitis C test declined [None] : None [Alone] : lives alone [Employed] : employed [] :  [# Of Children ___] : has [unfilled] children [Fully functional (bathing, dressing, toileting, transferring, walking, feeding)] : Fully functional (bathing, dressing, toileting, transferring, walking, feeding) [Fully functional (using the telephone, shopping, preparing meals, housekeeping, doing laundry, using] : Fully functional and needs no help or supervision to perform IADLs (using the telephone, shopping, preparing meals, housekeeping, doing laundry, using transportation, managing medications and managing finances) [Reviewed no changes] : Reviewed, no changes [Never] : Never [Excellent] : ~his/her~  mood as  excellent [Other reason not done] : Other reason not done [I have developed a follow-up plan documented below in the note.] : I have developed a follow-up plan documented below in the note. [Audit-CScore] : 0 [EyeExamDate] : 12/23 [Patient reported PAP Smear was normal] : Patient reported PAP Smear was normal [Change in mental status noted] : No change in mental status noted [Reports changes in hearing] : Reports no changes in hearing [Reports changes in vision] : Reports no changes in vision [MammogramDate] : 01/24 [MammogramComments] : normal per patient [PapSmearDate] : 01/24 [PapSmearComments] : s/p BARBIE. Normal per patient [ColonoscopyDate] : 06/17 [AdvancecareDate] : 04/24

## 2024-04-19 LAB
ALBUMIN SERPL ELPH-MCNC: 4.5 G/DL
ALP BLD-CCNC: 94 U/L
ALT SERPL-CCNC: 25 U/L
ANION GAP SERPL CALC-SCNC: 16 MMOL/L
AST SERPL-CCNC: 18 U/L
BILIRUB SERPL-MCNC: 0.3 MG/DL
BUN SERPL-MCNC: 23 MG/DL
CALCIUM SERPL-MCNC: 9.5 MG/DL
CHLORIDE SERPL-SCNC: 104 MMOL/L
CHOLEST SERPL-MCNC: 159 MG/DL
CO2 SERPL-SCNC: 21 MMOL/L
CREAT SERPL-MCNC: 0.9 MG/DL
CREAT SPEC-SCNC: 165 MG/DL
EGFR: 73 ML/MIN/1.73M2
ESTIMATED AVERAGE GLUCOSE: 160 MG/DL
GLUCOSE SERPL-MCNC: 153 MG/DL
HBA1C MFR BLD HPLC: 7.2 %
HDLC SERPL-MCNC: 35 MG/DL
LDLC SERPL CALC-MCNC: 84 MG/DL
MICROALBUMIN 24H UR DL<=1MG/L-MCNC: 2.3 MG/DL
MICROALBUMIN/CREAT 24H UR-RTO: 14 MG/G
NONHDLC SERPL-MCNC: 124 MG/DL
POTASSIUM SERPL-SCNC: 4.5 MMOL/L
PROT SERPL-MCNC: 6.7 G/DL
SODIUM SERPL-SCNC: 141 MMOL/L
TRIGL SERPL-MCNC: 239 MG/DL
VIT B12 SERPL-MCNC: 435 PG/ML

## 2024-05-22 ENCOUNTER — APPOINTMENT (OUTPATIENT)
Dept: CARDIOLOGY | Facility: CLINIC | Age: 59
End: 2024-05-22
Payer: COMMERCIAL

## 2024-05-22 VITALS
HEART RATE: 71 BPM | SYSTOLIC BLOOD PRESSURE: 130 MMHG | WEIGHT: 194 LBS | DIASTOLIC BLOOD PRESSURE: 77 MMHG | HEIGHT: 66 IN | TEMPERATURE: 97 F | OXYGEN SATURATION: 97 % | BODY MASS INDEX: 31.18 KG/M2

## 2024-05-22 VITALS — DIASTOLIC BLOOD PRESSURE: 69 MMHG | SYSTOLIC BLOOD PRESSURE: 133 MMHG

## 2024-05-22 DIAGNOSIS — Z78.9 OTHER SPECIFIED HEALTH STATUS: ICD-10-CM

## 2024-05-22 DIAGNOSIS — F43.9 REACTION TO SEVERE STRESS, UNSPECIFIED: ICD-10-CM

## 2024-05-22 DIAGNOSIS — E66.9 OBESITY, UNSPECIFIED: ICD-10-CM

## 2024-05-22 DIAGNOSIS — R00.2 PALPITATIONS: ICD-10-CM

## 2024-05-22 DIAGNOSIS — Z83.438 FAMILY HISTORY OF OTHER DISORDER OF LIPOPROTEIN METABOLISM AND OTHER LIPIDEMIA: ICD-10-CM

## 2024-05-22 DIAGNOSIS — I10 ESSENTIAL (PRIMARY) HYPERTENSION: ICD-10-CM

## 2024-05-22 DIAGNOSIS — F41.9 ANXIETY DISORDER, UNSPECIFIED: ICD-10-CM

## 2024-05-22 DIAGNOSIS — M79.2 NEURALGIA AND NEURITIS, UNSPECIFIED: ICD-10-CM

## 2024-05-22 DIAGNOSIS — M79.604 PAIN IN RIGHT LEG: ICD-10-CM

## 2024-05-22 DIAGNOSIS — M79.605 PAIN IN RIGHT LEG: ICD-10-CM

## 2024-05-22 DIAGNOSIS — E78.5 HYPERLIPIDEMIA, UNSPECIFIED: ICD-10-CM

## 2024-05-22 PROCEDURE — 93000 ELECTROCARDIOGRAM COMPLETE: CPT

## 2024-05-22 PROCEDURE — 99204 OFFICE O/P NEW MOD 45 MIN: CPT | Mod: 25

## 2024-05-22 NOTE — HISTORY OF PRESENT ILLNESS
[FreeTextEntry1] : 59 y/o female with PMHx of DM ( recently diagnosed ) started on metformin ( she thinks he had side effects from metformin , neuropathy ? !) also has HTN , hyperlipidemia  occasionally gets palpitations no exertional chest pain or  SOB, palpitations, dizziness or syncope  constant left antecubital pain  FH of father had CABG when he was 80  LDL 84 , HDL 35

## 2024-05-22 NOTE — PHYSICAL EXAM
[Well Developed] : well developed [No Acute Distress] : no acute distress [Normal Venous Pressure] : normal venous pressure [No Carotid Bruit] : no carotid bruit [Normal S1, S2] : normal S1, S2 [No Murmur] : no murmur [No Rub] : no rub [Clear Lung Fields] : clear lung fields [Good Air Entry] : good air entry [No Respiratory Distress] : no respiratory distress  [Soft] : abdomen soft [Non Tender] : non-tender [No Masses/organomegaly] : no masses/organomegaly [Normal Bowel Sounds] : normal bowel sounds [No Edema] : no edema [No Clubbing] : no clubbing [Moves all extremities] : moves all extremities [No Focal Deficits] : no focal deficits [Normal Speech] : normal speech [Alert and Oriented] : alert and oriented [Normal memory] : normal memory

## 2024-05-22 NOTE — ASSESSMENT
[FreeTextEntry1] : Multiple risk factors for CAD ( DM , HTN, hyperlipidemia, FH of CAD )  [place] risk score 3.3 % risk of MI or death at 10 years HDL 35 , LD 84 on rosuvastatin  will order a CT calcium score to further risk stratify .

## 2024-05-28 ENCOUNTER — RX RENEWAL (OUTPATIENT)
Age: 59
End: 2024-05-28

## 2024-05-28 RX ORDER — METFORMIN ER 500 MG 500 MG/1
500 TABLET ORAL
Qty: 180 | Refills: 0 | Status: ACTIVE | COMMUNITY
Start: 2024-05-28 | End: 1900-01-01

## 2024-06-24 ENCOUNTER — RX RENEWAL (OUTPATIENT)
Age: 59
End: 2024-06-24

## 2024-06-24 RX ORDER — FLUOXETINE HYDROCHLORIDE 10 MG/1
10 CAPSULE ORAL
Qty: 90 | Refills: 0 | Status: ACTIVE | COMMUNITY
Start: 2021-09-29 | End: 1900-01-01

## 2024-06-25 ENCOUNTER — TRANSCRIPTION ENCOUNTER (OUTPATIENT)
Age: 59
End: 2024-06-25

## 2024-06-25 RX ORDER — LOSARTAN POTASSIUM 25 MG/1
25 TABLET, FILM COATED ORAL DAILY
Qty: 1 | Refills: 1 | Status: ACTIVE | COMMUNITY
Start: 2021-09-29 | End: 1900-01-01

## 2024-06-25 RX ORDER — ROSUVASTATIN CALCIUM 10 MG/1
10 TABLET, FILM COATED ORAL DAILY
Qty: 90 | Refills: 1 | Status: ACTIVE | COMMUNITY
Start: 2023-02-07 | End: 1900-01-01

## 2024-08-07 ENCOUNTER — APPOINTMENT (OUTPATIENT)
Dept: NEUROLOGY | Facility: CLINIC | Age: 59
End: 2024-08-07

## 2024-08-23 ENCOUNTER — RX RENEWAL (OUTPATIENT)
Age: 59
End: 2024-08-23

## 2024-08-28 ENCOUNTER — APPOINTMENT (OUTPATIENT)
Dept: INTERNAL MEDICINE | Facility: CLINIC | Age: 59
End: 2024-08-28
Payer: COMMERCIAL

## 2024-08-28 VITALS
HEIGHT: 66 IN | BODY MASS INDEX: 31.5 KG/M2 | SYSTOLIC BLOOD PRESSURE: 150 MMHG | OXYGEN SATURATION: 98 % | HEART RATE: 77 BPM | TEMPERATURE: 97.6 F | RESPIRATION RATE: 16 BRPM | DIASTOLIC BLOOD PRESSURE: 82 MMHG | WEIGHT: 196 LBS

## 2024-08-28 VITALS — DIASTOLIC BLOOD PRESSURE: 80 MMHG | SYSTOLIC BLOOD PRESSURE: 142 MMHG

## 2024-08-28 DIAGNOSIS — E11.9 TYPE 2 DIABETES MELLITUS W/OUT COMPLICATIONS: ICD-10-CM

## 2024-08-28 DIAGNOSIS — I10 ESSENTIAL (PRIMARY) HYPERTENSION: ICD-10-CM

## 2024-08-28 DIAGNOSIS — F41.1 GENERALIZED ANXIETY DISORDER: ICD-10-CM

## 2024-08-28 PROCEDURE — 99214 OFFICE O/P EST MOD 30 MIN: CPT

## 2024-08-28 NOTE — HISTORY OF PRESENT ILLNESS
[FreeTextEntry1] : uncontrolled DM,  [de-identified] : -PMH: DM, HTN, HLD, Anxiety, Depression, H/o R. Retinal tear -SH: . 2 Children. Lives w/her mother. Non-smoker. Occasional EtOH use.  DEONTE is a 58 year F whom is here today for f/u DM  -HTN: Remains on Losartan 25mg QD -HLD: Remains on Crestor 10mg -T2DM: Remains on Metformin ER 1000mg QD. On ASA & Statin. Compliant w/ BG monitoring. (4/2024) A1c 7.2.  -Anxiety, Depression: Relates it to the loss of her father in 2017. Reports issues with anxiety & depression in the past. Remains on Fluoxetine 10mg QD. Follows w/ a therapist Qweekly.

## 2024-08-28 NOTE — ASSESSMENT
[FreeTextEntry1] : -PMH: DM, HTN, HLD, Anxiety, Depression, H/o R. Retinal tear -SH: . 2 Children. Lives w/her mother. Non-smoker. Occasional EtOH use.  DEONTE is a 58 year F whom is here today  Specialists Involved: -OBGYN: Dr. Aubrie Espitia ([phone]) -GI: Dr. Foley ([phone]) -Optho: Dr. Chalo Lizarraga  -F/u labs drawn in office today -Further recs pending lab results -Cardio Consult (h/o Obesity, DM, HLD, HTN) -Continue routine screening w/ Gyn (Cervical CA Screening) -RTO 3mo for routine f/u & labs or sooner if needed

## 2024-08-30 LAB
ALBUMIN SERPL ELPH-MCNC: 4.4 G/DL
ALP BLD-CCNC: 100 U/L
ALT SERPL-CCNC: 25 U/L
ANION GAP SERPL CALC-SCNC: 15 MMOL/L
AST SERPL-CCNC: 21 U/L
BILIRUB SERPL-MCNC: 0.2 MG/DL
BUN SERPL-MCNC: 16 MG/DL
CALCIUM SERPL-MCNC: 9.7 MG/DL
CHLORIDE SERPL-SCNC: 103 MMOL/L
CO2 SERPL-SCNC: 22 MMOL/L
CREAT SERPL-MCNC: 0.88 MG/DL
EGFR: 75 ML/MIN/1.73M2
ESTIMATED AVERAGE GLUCOSE: 169 MG/DL
GLUCOSE SERPL-MCNC: 105 MG/DL
HBA1C MFR BLD HPLC: 7.5 %
POTASSIUM SERPL-SCNC: 4.7 MMOL/L
PROT SERPL-MCNC: 6.7 G/DL
SODIUM SERPL-SCNC: 141 MMOL/L

## 2024-09-04 RX ORDER — METFORMIN HYDROCHLORIDE 850 MG/1
850 TABLET, COATED ORAL TWICE DAILY
Qty: 180 | Refills: 0 | Status: ACTIVE | COMMUNITY
Start: 2024-08-28 | End: 1900-01-01

## 2024-09-17 ENCOUNTER — APPOINTMENT (OUTPATIENT)
Dept: INTERNAL MEDICINE | Facility: CLINIC | Age: 59
End: 2024-09-17
Payer: COMMERCIAL

## 2024-09-17 VITALS
RESPIRATION RATE: 16 BRPM | TEMPERATURE: 97.3 F | WEIGHT: 195 LBS | DIASTOLIC BLOOD PRESSURE: 70 MMHG | HEIGHT: 66 IN | BODY MASS INDEX: 31.34 KG/M2 | HEART RATE: 90 BPM | OXYGEN SATURATION: 98 % | SYSTOLIC BLOOD PRESSURE: 130 MMHG

## 2024-09-17 DIAGNOSIS — F41.1 GENERALIZED ANXIETY DISORDER: ICD-10-CM

## 2024-09-17 DIAGNOSIS — E11.9 TYPE 2 DIABETES MELLITUS W/OUT COMPLICATIONS: ICD-10-CM

## 2024-09-17 DIAGNOSIS — J06.9 ACUTE UPPER RESPIRATORY INFECTION, UNSPECIFIED: ICD-10-CM

## 2024-09-17 PROCEDURE — 99214 OFFICE O/P EST MOD 30 MIN: CPT

## 2024-09-17 RX ORDER — DOXYCYCLINE HYCLATE 100 MG/1
100 TABLET ORAL TWICE DAILY
Qty: 10 | Refills: 0 | Status: ACTIVE | COMMUNITY
Start: 2024-09-17 | End: 1900-01-01

## 2024-09-17 NOTE — ASSESSMENT
[FreeTextEntry1] : normal vs & pe given length of symptoms will tx for presumed bacterial sinusitis otc supportive care as advised rto if not improving

## 2024-09-17 NOTE — HISTORY OF PRESENT ILLNESS
[Moderate] : moderate [___ Weeks ago] :  [unfilled] weeks ago [Gradual] : gradually [Constant] : constant [Congestion] : congestion [Cough] : cough [Sore Throat] : no sore throat [Wheezing] : wheezing [Chills] : no chills [Anorexia] : no anorexia [Shortness Of Breath] : no shortness of breath [Earache] : no earache [Headache] : no headache [Fever] : no fever [OTC Remedies] : OTC remedies [Stable] : stable [FreeTextEntry8] : -PMH: DM, HTN, HLD, Anxiety, Depression, H/o R. Retinal tear -SH: . 2 Children. Lives w/her mother. Non-smoker. Occasional EtOH use.  DEONTE is a 58 year F whom is here today w/ c/o cough & Nasal congestion She is also here for f/u DM. tolerating metformin 850mg bid well w/o issue. Avg fasting bg 140. on med x 10 days

## 2024-09-23 ENCOUNTER — RX RENEWAL (OUTPATIENT)
Age: 59
End: 2024-09-23

## 2024-12-02 ENCOUNTER — RX RENEWAL (OUTPATIENT)
Age: 59
End: 2024-12-02

## 2024-12-11 ENCOUNTER — APPOINTMENT (OUTPATIENT)
Dept: INTERNAL MEDICINE | Facility: CLINIC | Age: 59
End: 2024-12-11
Payer: COMMERCIAL

## 2024-12-11 VITALS
OXYGEN SATURATION: 98 % | DIASTOLIC BLOOD PRESSURE: 72 MMHG | RESPIRATION RATE: 16 BRPM | HEART RATE: 97 BPM | SYSTOLIC BLOOD PRESSURE: 130 MMHG | HEIGHT: 66 IN | WEIGHT: 191 LBS | TEMPERATURE: 97.3 F | BODY MASS INDEX: 30.7 KG/M2

## 2024-12-11 DIAGNOSIS — E66.9 OBESITY, UNSPECIFIED: ICD-10-CM

## 2024-12-11 DIAGNOSIS — F33.1 MAJOR DEPRESSIVE DISORDER, RECURRENT, MODERATE: ICD-10-CM

## 2024-12-11 DIAGNOSIS — H33.311 HORSESHOE TEAR OF RETINA W/OUT DETACHMENT, RIGHT EYE: ICD-10-CM

## 2024-12-11 DIAGNOSIS — I10 ESSENTIAL (PRIMARY) HYPERTENSION: ICD-10-CM

## 2024-12-11 DIAGNOSIS — F41.1 GENERALIZED ANXIETY DISORDER: ICD-10-CM

## 2024-12-11 DIAGNOSIS — Z87.898 PERSONAL HISTORY OF OTHER SPECIFIED CONDITIONS: ICD-10-CM

## 2024-12-11 DIAGNOSIS — E78.5 HYPERLIPIDEMIA, UNSPECIFIED: ICD-10-CM

## 2024-12-11 DIAGNOSIS — E11.9 TYPE 2 DIABETES MELLITUS W/OUT COMPLICATIONS: ICD-10-CM

## 2024-12-11 PROCEDURE — 99214 OFFICE O/P EST MOD 30 MIN: CPT

## 2024-12-11 PROCEDURE — G2211 COMPLEX E/M VISIT ADD ON: CPT

## 2024-12-12 LAB
ANION GAP SERPL CALC-SCNC: 13 MMOL/L
BUN SERPL-MCNC: 19 MG/DL
CALCIUM SERPL-MCNC: 10 MG/DL
CHLORIDE SERPL-SCNC: 101 MMOL/L
CO2 SERPL-SCNC: 24 MMOL/L
CREAT SERPL-MCNC: 0.9 MG/DL
EGFR: 73 ML/MIN/1.73M2
GLUCOSE SERPL-MCNC: 143 MG/DL
POTASSIUM SERPL-SCNC: 4.6 MMOL/L
SODIUM SERPL-SCNC: 139 MMOL/L

## 2024-12-16 LAB
CHOLEST SERPL-MCNC: 153 MG/DL
ESTIMATED AVERAGE GLUCOSE: 171 MG/DL
HBA1C MFR BLD HPLC: 7.6 %
HDLC SERPL-MCNC: 35 MG/DL
LDLC SERPL CALC-MCNC: 77 MG/DL
NONHDLC SERPL-MCNC: 118 MG/DL
TRIGL SERPL-MCNC: 245 MG/DL

## 2025-03-19 ENCOUNTER — APPOINTMENT (OUTPATIENT)
Dept: INTERNAL MEDICINE | Facility: CLINIC | Age: 60
End: 2025-03-19
Payer: COMMERCIAL

## 2025-03-19 VITALS
HEART RATE: 108 BPM | BODY MASS INDEX: 30.37 KG/M2 | DIASTOLIC BLOOD PRESSURE: 80 MMHG | RESPIRATION RATE: 16 BRPM | HEIGHT: 66 IN | OXYGEN SATURATION: 97 % | SYSTOLIC BLOOD PRESSURE: 110 MMHG | TEMPERATURE: 97.3 F | WEIGHT: 189 LBS

## 2025-03-19 DIAGNOSIS — R00.2 PALPITATIONS: ICD-10-CM

## 2025-03-19 DIAGNOSIS — E11.9 TYPE 2 DIABETES MELLITUS W/OUT COMPLICATIONS: ICD-10-CM

## 2025-03-19 DIAGNOSIS — F41.1 GENERALIZED ANXIETY DISORDER: ICD-10-CM

## 2025-03-19 DIAGNOSIS — E66.9 OBESITY, UNSPECIFIED: ICD-10-CM

## 2025-03-19 DIAGNOSIS — E78.5 HYPERLIPIDEMIA, UNSPECIFIED: ICD-10-CM

## 2025-03-19 DIAGNOSIS — I10 ESSENTIAL (PRIMARY) HYPERTENSION: ICD-10-CM

## 2025-03-19 PROCEDURE — G2211 COMPLEX E/M VISIT ADD ON: CPT

## 2025-03-19 PROCEDURE — 99214 OFFICE O/P EST MOD 30 MIN: CPT

## 2025-03-19 RX ORDER — EMPAGLIFLOZIN 10 MG/1
10 TABLET, FILM COATED ORAL DAILY
Qty: 90 | Refills: 0 | Status: ACTIVE | COMMUNITY
Start: 2025-03-19 | End: 1900-01-01

## 2025-03-20 LAB
BASOPHILS # BLD AUTO: 0.08 K/UL
BASOPHILS NFR BLD AUTO: 0.9 %
EOSINOPHIL # BLD AUTO: 0.47 K/UL
EOSINOPHIL NFR BLD AUTO: 5.2 %
HCT VFR BLD CALC: 41.2 %
HGB BLD-MCNC: 13.1 G/DL
IMM GRANULOCYTES NFR BLD AUTO: 0.6 %
LYMPHOCYTES # BLD AUTO: 2.33 K/UL
LYMPHOCYTES NFR BLD AUTO: 26 %
MAN DIFF?: NORMAL
MCHC RBC-ENTMCNC: 28.1 PG
MCHC RBC-ENTMCNC: 31.8 G/DL
MCV RBC AUTO: 88.2 FL
MONOCYTES # BLD AUTO: 0.57 K/UL
MONOCYTES NFR BLD AUTO: 6.4 %
NEUTROPHILS # BLD AUTO: 5.46 K/UL
NEUTROPHILS NFR BLD AUTO: 60.9 %
PLATELET # BLD AUTO: 380 K/UL
RBC # BLD: 4.67 M/UL
RBC # FLD: 13.5 %
TSH SERPL-ACNC: 1.11 UIU/ML
WBC # FLD AUTO: 8.96 K/UL

## 2025-03-21 LAB
ALBUMIN SERPL ELPH-MCNC: 4.4 G/DL
ALP BLD-CCNC: 105 U/L
ALT SERPL-CCNC: 36 U/L
ANION GAP SERPL CALC-SCNC: 14 MMOL/L
AST SERPL-CCNC: 32 U/L
BILIRUB SERPL-MCNC: 0.2 MG/DL
BUN SERPL-MCNC: 21 MG/DL
CALCIUM SERPL-MCNC: 9.8 MG/DL
CHLORIDE SERPL-SCNC: 105 MMOL/L
CHOLEST SERPL-MCNC: 153 MG/DL
CO2 SERPL-SCNC: 23 MMOL/L
CREAT SERPL-MCNC: 1.33 MG/DL
EGFRCR SERPLBLD CKD-EPI 2021: 46 ML/MIN/1.73M2
ESTIMATED AVERAGE GLUCOSE: 160 MG/DL
GLUCOSE SERPL-MCNC: 143 MG/DL
HBA1C MFR BLD HPLC: 7.2 %
HDLC SERPL-MCNC: 32 MG/DL
LDLC SERPL-MCNC: 54 MG/DL
NONHDLC SERPL-MCNC: 121 MG/DL
POTASSIUM SERPL-SCNC: 4.5 MMOL/L
PROT SERPL-MCNC: 6.9 G/DL
SODIUM SERPL-SCNC: 142 MMOL/L
TRIGL SERPL-MCNC: 445 MG/DL
VIT B12 SERPL-MCNC: 564 PG/ML

## 2025-03-24 ENCOUNTER — RX RENEWAL (OUTPATIENT)
Age: 60
End: 2025-03-24

## 2025-03-25 DIAGNOSIS — N17.9 ACUTE KIDNEY FAILURE, UNSPECIFIED: ICD-10-CM

## 2025-03-25 LAB
ANION GAP SERPL CALC-SCNC: 12 MMOL/L
BUN SERPL-MCNC: 18 MG/DL
CALCIUM SERPL-MCNC: 9.8 MG/DL
CHLORIDE SERPL-SCNC: 107 MMOL/L
CHOLEST SERPL-MCNC: 146 MG/DL
CO2 SERPL-SCNC: 24 MMOL/L
CREAT SERPL-MCNC: 0.84 MG/DL
EGFRCR SERPLBLD CKD-EPI 2021: 79 ML/MIN/1.73M2
GLUCOSE SERPL-MCNC: 158 MG/DL
HDLC SERPL-MCNC: 31 MG/DL
LDLC SERPL-MCNC: 67 MG/DL
NONHDLC SERPL-MCNC: 114 MG/DL
POTASSIUM SERPL-SCNC: 4.9 MMOL/L
SODIUM SERPL-SCNC: 143 MMOL/L
TRIGL SERPL-MCNC: 297 MG/DL

## 2025-04-18 ENCOUNTER — APPOINTMENT (OUTPATIENT)
Dept: INTERNAL MEDICINE | Facility: CLINIC | Age: 60
End: 2025-04-18

## 2025-04-18 VITALS
HEIGHT: 66 IN | SYSTOLIC BLOOD PRESSURE: 120 MMHG | TEMPERATURE: 97.4 F | DIASTOLIC BLOOD PRESSURE: 80 MMHG | BODY MASS INDEX: 29.41 KG/M2 | HEART RATE: 95 BPM | RESPIRATION RATE: 16 BRPM | WEIGHT: 183 LBS | OXYGEN SATURATION: 98 %

## 2025-04-18 DIAGNOSIS — E11.9 TYPE 2 DIABETES MELLITUS W/OUT COMPLICATIONS: ICD-10-CM

## 2025-04-18 DIAGNOSIS — Z87.39 PERSONAL HISTORY OF OTHER DISEASES OF THE MUSCULOSKELETAL SYSTEM AND CONNECTIVE TISSUE: ICD-10-CM

## 2025-04-18 DIAGNOSIS — E66.9 OBESITY, UNSPECIFIED: ICD-10-CM

## 2025-04-18 PROCEDURE — 99213 OFFICE O/P EST LOW 20 MIN: CPT

## 2025-04-18 RX ORDER — SITAGLIPTIN 50 MG/1
50 TABLET, FILM COATED ORAL DAILY
Qty: 90 | Refills: 0 | Status: ACTIVE | COMMUNITY
Start: 2025-04-18 | End: 1900-01-01

## 2025-05-21 ENCOUNTER — APPOINTMENT (OUTPATIENT)
Dept: INTERNAL MEDICINE | Facility: CLINIC | Age: 60
End: 2025-05-21

## 2025-06-02 ENCOUNTER — RX RENEWAL (OUTPATIENT)
Age: 60
End: 2025-06-02